# Patient Record
Sex: FEMALE | Race: WHITE | NOT HISPANIC OR LATINO | Employment: STUDENT | URBAN - METROPOLITAN AREA
[De-identification: names, ages, dates, MRNs, and addresses within clinical notes are randomized per-mention and may not be internally consistent; named-entity substitution may affect disease eponyms.]

---

## 2019-12-17 LAB — EXTERNAL CHLAMYDIA RESULT: NEGATIVE

## 2021-12-20 ENCOUNTER — VBI (OUTPATIENT)
Dept: ADMINISTRATIVE | Facility: OTHER | Age: 21
End: 2021-12-20

## 2021-12-30 RX ORDER — NORETHINDRONE ACETATE AND ETHINYL ESTRADIOL 1MG-20(21)
1 KIT ORAL DAILY
COMMUNITY
End: 2022-01-03

## 2021-12-30 RX ORDER — BIOTIN 10 MG
1 TABLET ORAL DAILY
COMMUNITY

## 2022-01-03 ENCOUNTER — ANNUAL EXAM (OUTPATIENT)
Dept: OBGYN CLINIC | Facility: CLINIC | Age: 22
End: 2022-01-03
Payer: COMMERCIAL

## 2022-01-03 VITALS
DIASTOLIC BLOOD PRESSURE: 64 MMHG | SYSTOLIC BLOOD PRESSURE: 110 MMHG | HEIGHT: 64 IN | WEIGHT: 142.4 LBS | BODY MASS INDEX: 24.31 KG/M2

## 2022-01-03 DIAGNOSIS — Z01.419 WOMEN'S ANNUAL ROUTINE GYNECOLOGICAL EXAMINATION: Primary | ICD-10-CM

## 2022-01-03 DIAGNOSIS — Z11.3 SCREEN FOR STD (SEXUALLY TRANSMITTED DISEASE): ICD-10-CM

## 2022-01-03 PROCEDURE — 99395 PREV VISIT EST AGE 18-39: CPT | Performed by: STUDENT IN AN ORGANIZED HEALTH CARE EDUCATION/TRAINING PROGRAM

## 2022-01-03 RX ORDER — ATOMOXETINE 80 MG/1
1 CAPSULE ORAL DAILY
COMMUNITY
Start: 2021-12-14

## 2022-01-03 NOTE — PATIENT INSTRUCTIONS
Pap every 3 years if normal, STI testing as indicated, exercise most days of week, obtain appropriate diet and hydration, Calcium 1000mg + 600 vit D daily, birth control as directed   Condom use when sexually active for sexually transmitted infection prevention    Annual mammogram starting at age 35, monthly breast self exam

## 2022-01-03 NOTE — PROGRESS NOTES
[address], [address], [facility], [address] [facility]    ASSESSMENT/PLAN: Ashanti Jovel is a 24 y o  Michele Eaton who presents for annual gynecologic exam     Encounter for routine gynecologic examination  - Routine well woman exam completed today  - Cervical Cancer Screening: Current ASCCP Guidelines reviewed  Last Pap: 12/30/2020  History of abnormal: None  - STI screening offered including HIV testing: GC/CT and blood work ordered  - Contraceptive counseling discussed  Current contraception: no method  Same sex partner, cycles regular  Declines contraception   - The following were reviewed in today's visit: breast self exam, STD testing and family planning choices    Additional problems addressed during this visit:  1  Women's annual routine gynecological examination    2  Screen for STD (sexually transmitted disease)  -     Chlamydia/N  gonorrhoeae RNA, TMA  -     RPR (MONITOR) W/REFL TITER (REFL); Future  -     Human Immunodeficiency Virus 1/2 Antigen / Antibody ( Fourth Generation) with Reflex Testing; Future  -     Hepatitis C Ab W/Refl To HCV RNA, Qn, PCR; Future        CC:  Annual Gynecologic Examination    HPI: Ashanti Jovel is a 24 y o  Michele Eaton who presents for annual gynecologic examination  ROS: Negative except as noted in HPI    Patient's last menstrual period was 12/27/2021  Menstrual History  Period Cycle (Days): 28  Period Duration (Days): 3-4  Period Pattern: Regular  Menstrual Flow: Heavy  Menstrual Control: Tampon  Dysmenorrhea: (!) Moderate  Dysmenorrhea Symptoms: Cramping    She  reports being sexually active and has had partner(s) who are female  She reports using the following method of birth control/protection: None    Sexual History  Sexual Assault: No  Number of Partners in Last Year: 1 years  Sexually Transmitted Infection History: None  Multiple Sex Partners: No  Is Patient in a Monogamous Relationship?: Yes    The following portions of the patient's history were reviewed and updated as appropriate:   No past medical history on file  Past Surgical History:   Procedure Laterality Date    TONSILECTOMY AND ADNOIDECTOMY       Family History   Problem Relation Age of Onset    Breast cancer Paternal Grandmother 79    Lung cancer Paternal Grandmother     Ovarian cancer Neg Hx     Uterine cancer Neg Hx     Colon cancer Neg Hx      Social History     Socioeconomic History    Marital status: Single     Spouse name: None    Number of children: None    Years of education: None    Highest education level: None   Occupational History    None   Tobacco Use    Smoking status: Never Smoker    Smokeless tobacco: Never Used   Vaping Use    Vaping Use: Never used   Substance and Sexual Activity    Alcohol use: Yes     Comment: social    Drug use: Never    Sexual activity: Yes     Partners: Female     Birth control/protection: None   Other Topics Concern    None   Social History Narrative    Sexual Abuse History: no    Exercise: Occassionally Occassionally    Domestic violence: No No    History of drug/alcohol abuse denies     Social Determinants of Health     Financial Resource Strain: Not on file   Food Insecurity: Not on file   Transportation Needs: Not on file   Physical Activity: Not on file   Stress: Not on file   Social Connections: Not on file   Intimate Partner Violence: Not on file   Housing Stability: Not on file     Outpatient Medications Marked as Taking for the 1/3/22 encounter (Annual Exam) with Camila Adams MD   Medication    atomoxetine (STRATTERA) 80 MG capsule    Biotin 10 MG TABS     No Known Allergies        Objective:  /64 (BP Location: Left arm, Patient Position: Sitting, Cuff Size: Standard)   Ht 5' 3 5" (1 613 m)   Wt 64 6 kg (142 lb 6 4 oz)   LMP 12/27/2021   Breastfeeding No   BMI 24 83 kg/m²        Chaperone present? Yes: Danilo Nelson MA  General Appearance: alert and oriented, in no acute distress     Neck/Thyroid: No thyromegaly, no thyroid nodules  Respiratory: Unlabored breathing  Cardiovascular: Regular rate, no peripheral edema  Abdomen: Soft, non-tender, non-distended, no masses, no rebound or guarding  Breast Exam: No dimpling, nipple retraction or discharge  No lumps or masses  No axillary or supraclavicular nodes  Pelvic:       External genitalia: Normal appearance, no abnormal pigmentation, no lesions or masses  Normal Bartholin's and [place]'s  Urinary system: Urethral meatus normal, bladder non-tender  Vaginal: normal mucosa without prolapse or lesions  Normal-appearing physiologic discharge  Cervix: Normal-appearing, well-epithelialized, no gross lesions or masses  No cervical motion tenderness  Adnexa: No adnexal masses or tenderness noted  Uterus: Normal-sized, regular contour, midline, mobile, no uterine tenderness  Extremities: Normal range of motion  Warm, well-perfused, non-tender     Skin: normal, no rash or abnormalities  Neurologic: alert, oriented x3  Psychiatric: Appropriate affect, mood stable, cooperative with exam         Lesley Dunham MD  1/3/2022 1:32 PM

## 2022-01-04 LAB
C TRACH RRNA SPEC QL NAA+PROBE: NOT DETECTED
N GONORRHOEA RRNA SPEC QL NAA+PROBE: NOT DETECTED

## 2023-02-09 ENCOUNTER — OFFICE VISIT (OUTPATIENT)
Dept: OBGYN CLINIC | Facility: CLINIC | Age: 23
End: 2023-02-09

## 2023-02-09 VITALS
BODY MASS INDEX: 23.22 KG/M2 | HEIGHT: 64 IN | SYSTOLIC BLOOD PRESSURE: 100 MMHG | DIASTOLIC BLOOD PRESSURE: 60 MMHG | WEIGHT: 136 LBS

## 2023-02-09 DIAGNOSIS — Z01.419 ENCOUNTER FOR GYNECOLOGICAL EXAMINATION WITHOUT ABNORMAL FINDING: Primary | ICD-10-CM

## 2023-02-09 DIAGNOSIS — Z80.3 FH: BREAST CANCER: ICD-10-CM

## 2023-02-09 DIAGNOSIS — Z11.3 SCREEN FOR STD (SEXUALLY TRANSMITTED DISEASE): ICD-10-CM

## 2023-02-09 RX ORDER — DEXMETHYLPHENIDATE HYDROCHLORIDE 25 MG/1
25 CAPSULE, EXTENDED RELEASE ORAL DAILY
COMMUNITY
Start: 2023-01-26

## 2023-02-09 NOTE — PROGRESS NOTES
Assessment/Plan:    Pap every 3 years if normal, Last 12/2020 STI testing as indicated, exercise most days of week, obtain appropriate diet and hydration, Calcium 1000mg + 600 vit D daily, Annual mammogram starting at age 35, monthly breast self exam     Diagnoses and all orders for this visit:    Encounter for gynecological examination without abnormal finding  -     Chlamydia/N  gonorrhoeae RNA, TMA    Screen for STD (sexually transmitted disease)  -     Chlamydia/N  gonorrhoeae RNA, TMA    FH: breast cancer PGM    Other orders  -     dexmethylphenidate (FOCALIN XR) 25 MG 24 hr capsule; Take 25 mg by mouth daily          Subjective:      Patient ID: Chris Fuchs is a 20 y o  female  Go here for annual gyn Same sex partner PAP 12/28/2020 neg Doing well No Concerns Periods monthly normal flow  Denies other abd or pelvic pain  Bowel and bladder are normal No unusual discharge Graduated [employer]  Does not start job until the summer Studying for [employer]       The following portions of the patient's history were reviewed and updated as appropriate: allergies, current medications, past family history, past medical history, past social history, past surgical history and problem list     Review of Systems   Constitutional: Negative for fatigue and unexpected weight change  Gastrointestinal: Negative for abdominal distention, abdominal pain, constipation and diarrhea  Genitourinary: Negative for difficulty urinating, dyspareunia, dysuria, frequency, genital sores, menstrual problem, pelvic pain, urgency, vaginal bleeding, vaginal discharge and vaginal pain  Neurological: Negative for headaches  Psychiatric/Behavioral: Negative  Negative for dysphoric mood  The patient is not nervous/anxious  Objective:      /60   Ht 5' 3 75" (1 619 m)   Wt 61 7 kg (136 lb)   LMP 01/09/2023   BMI 23 53 kg/m²          Physical Exam  Vitals and nursing note reviewed     Constitutional:       General: She is not in acute distress  Appearance: Normal appearance  HENT:      Head: Normocephalic and atraumatic  Pulmonary:      Effort: Pulmonary effort is normal    Chest:   Breasts:     Breasts are symmetrical       Right: Normal  No mass, nipple discharge, skin change or tenderness  Left: Normal  No mass, nipple discharge, skin change or tenderness  Abdominal:      General: There is no distension  Palpations: Abdomen is soft  Tenderness: There is no abdominal tenderness  There is no guarding or rebound  Genitourinary:     General: Normal vulva  Exam position: Lithotomy position  Labia:         Right: No rash, tenderness, lesion or injury  Left: No rash, tenderness, lesion or injury  Urethra: No prolapse, urethral pain, urethral swelling or urethral lesion  Vagina: Normal  No erythema or lesions  Cervix: No cervical motion tenderness, discharge, lesion or cervical bleeding  Uterus: Normal        Adnexa: Right adnexa normal and left adnexa normal         Right: No mass or tenderness  Left: No mass or tenderness  Rectum: No mass or external hemorrhoid  Comments: G/C from cervix  Musculoskeletal:         General: Normal range of motion  Lymphadenopathy:      Upper Body:      Right upper body: No axillary adenopathy  Left upper body: No axillary adenopathy  Lower Body: No right inguinal adenopathy  No left inguinal adenopathy  Skin:     General: Skin is warm and dry  Neurological:      Mental Status: She is alert and oriented to person, place, and time  Psychiatric:         Mood and Affect: Mood normal          Behavior: Behavior normal          Thought Content:  Thought content normal          Judgment: Judgment normal

## 2023-02-09 NOTE — PATIENT INSTRUCTIONS
Pap every 3 years if normal, Last 12/2020 STI testing as indicated, exercise most days of week, obtain appropriate diet and hydration, Calcium 1000mg + 600 vit D daily, Annual mammogram starting at age 35, monthly breast self exam

## 2023-02-10 LAB
C TRACH RRNA SPEC QL NAA+PROBE: NOT DETECTED
N GONORRHOEA RRNA SPEC QL NAA+PROBE: NOT DETECTED

## 2024-03-03 NOTE — PROGRESS NOTES
"Assessment/Plan:    Pap every 3 years if normal, STI testing as indicated, exercise most days of week, obtain appropriate diet and hydration, Calcium 1000mg + 600 vit D daily, .   Annual mammogram starting at age 39, monthly breast self exam.      Diagnoses and all orders for this visit:    Encounter for gynecological examination without abnormal finding  -     Thinprep Tis Pap Reflex HPV mRNA E6/E7, Chlamydia/N.gonorrhoeae    Screen for STD (sexually transmitted disease)  -     Thinprep Tis Pap Reflex HPV mRNA E6/E7, Chlamydia/N.gonorrhoeae    Encounter for Papanicolaou smear for cervical cancer screening  -     Thinprep Tis Pap Reflex HPV mRNA E6/E7, Chlamydia/N.gonorrhoeae    FH: breast cancer PGM          Subjective:      Patient ID: Clarisse Chino is a 23 y.o. female.    Go here for annual gyn Same sex partner PAP 12/28/2020 neg Doing well No Concerns Periods monthly normal flow. 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    Review of Systems   Constitutional:  Negative for fatigue and unexpected weight change.   Gastrointestinal:  Negative for abdominal distention, abdominal pain, constipation and diarrhea.   Genitourinary:  Negative for difficulty urinating, dyspareunia, dysuria, frequency, genital sores, menstrual problem, pelvic pain, urgency, vaginal bleeding, vaginal discharge and vaginal pain.   Neurological:  Negative for headaches.   Psychiatric/Behavioral: Negative.  Negative for dysphoric mood. The patient is not nervous/anxious.          Objective:      BP (!) 88/58   Ht 5' 3.75\" (1.619 m)   Wt 62.1 kg (136 lb 12.8 oz)   LMP 03/01/2024 " (Exact Date)   Breastfeeding No   BMI 23.67 kg/m²          Physical Exam  Vitals and nursing note reviewed.   Constitutional:       General: She is not in acute distress.     Appearance: Normal appearance.   HENT:      Head: Normocephalic and atraumatic.   Pulmonary:      Effort: Pulmonary effort is normal.   Chest:   Breasts:     Breasts are symmetrical.      Right: Normal. No mass, nipple discharge, skin change or tenderness.      Left: Normal. No mass, nipple discharge, skin change or tenderness.   Abdominal:      General: There is no distension.      Palpations: Abdomen is soft.      Tenderness: There is no abdominal tenderness. There is no guarding or rebound.   Genitourinary:     General: Normal vulva.      Exam position: Lithotomy position.      Labia:         Right: No rash, tenderness, lesion or injury.         Left: No rash, tenderness, lesion or injury.       Urethra: No prolapse, urethral pain, urethral swelling or urethral lesion.      Vagina: Normal. No erythema or lesions.      Cervix: No cervical motion tenderness, discharge, lesion or cervical bleeding.      Uterus: Normal.       Adnexa: Right adnexa normal and left adnexa normal.        Right: No mass or tenderness.          Left: No mass or tenderness.        Rectum: No mass or external hemorrhoid.   Musculoskeletal:         General: Normal range of motion.   Lymphadenopathy:      Upper Body:      Right upper body: No axillary adenopathy.      Left upper body: No axillary adenopathy.      Lower Body: No right inguinal adenopathy. No left inguinal adenopathy.   Skin:     General: Skin is warm and dry.   Neurological:      Mental Status: She is alert and oriented to person, place, and time.   Psychiatric:         Mood and Affect: Mood normal.         Behavior: Behavior normal.         Thought Content: Thought content normal.         Judgment: Judgment normal.

## 2024-03-04 ENCOUNTER — OFFICE VISIT (OUTPATIENT)
Dept: OBGYN CLINIC | Facility: CLINIC | Age: 24
End: 2024-03-04
Payer: COMMERCIAL

## 2024-03-04 VITALS
DIASTOLIC BLOOD PRESSURE: 58 MMHG | BODY MASS INDEX: 23.35 KG/M2 | SYSTOLIC BLOOD PRESSURE: 88 MMHG | HEIGHT: 64 IN | WEIGHT: 136.8 LBS

## 2024-03-04 DIAGNOSIS — Z01.419 ENCOUNTER FOR GYNECOLOGICAL EXAMINATION WITHOUT ABNORMAL FINDING: Primary | ICD-10-CM

## 2024-03-04 DIAGNOSIS — Z11.3 SCREEN FOR STD (SEXUALLY TRANSMITTED DISEASE): ICD-10-CM

## 2024-03-04 DIAGNOSIS — Z80.3 FH: BREAST CANCER: ICD-10-CM

## 2024-03-04 DIAGNOSIS — Z12.4 ENCOUNTER FOR PAPANICOLAOU SMEAR FOR CERVICAL CANCER SCREENING: ICD-10-CM

## 2024-03-04 PROCEDURE — S0612 ANNUAL GYNECOLOGICAL EXAMINA: HCPCS | Performed by: NURSE PRACTITIONER

## 2024-03-06 LAB
C TRACH RRNA SPEC QL NAA+PROBE: NOT DETECTED
CLINICAL INFO: NORMAL
CYTO CVX: NORMAL
CYTOLOGY CMNT CVX/VAG CYTO-IMP: NORMAL
DATE PREVIOUS BX: NORMAL
LMP START DATE: NORMAL
N GONORRHOEA RRNA SPEC QL NAA+PROBE: NOT DETECTED
SL AMB PREV. PAP:: NORMAL
SPECIMEN SOURCE CVX/VAG CYTO: NORMAL

## 2025-04-06 NOTE — PROGRESS NOTES
"Assessment/Plan:  Pap every 3 years if normal, STI testing as indicated, exercise most days of week, obtain appropriate diet and hydration, Calcium 1000mg + 600 vit D daily, .   Annual mammogram starting at age 39, monthly breast self exam.          Diagnoses and all orders for this visit:    Encounter for gynecological examination without abnormal finding    FH: breast cancer PGM 69    Other orders  -     Liquid-based pap, screening          Subjective:      Patient ID: Clarisse Chino is a 24 y.o. female.    Go here for annual gyn Same sex partner PAP 3/4/2024 neg prior 12/28/2020 neg Doing well No Concerns Periods monthly normal flow. 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    Review of Systems   Constitutional:  Negative for fatigue and unexpected weight change.   Gastrointestinal:  Negative for abdominal distention, abdominal pain, constipation and diarrhea.   Genitourinary:  Negative for difficulty urinating, dyspareunia, dysuria, frequency, genital sores, menstrual problem, pelvic pain, urgency, vaginal bleeding, vaginal discharge and vaginal pain.   Neurological:  Negative for headaches.   Psychiatric/Behavioral: Negative.  Negative for dysphoric mood. The patient is not nervous/anxious.          Objective:      /68 (BP Location: Right arm, Patient Position: Sitting, Cuff Size: Standard)   Ht 5' 3.75\" (1.619 m)   Wt 62.1 kg (137 lb)   LMP 03/28/2025   BMI 23.70 kg/m²          Physical Exam  Vitals and nursing note reviewed.   Constitutional:       General: She is not in acute distress.     Appearance: Normal appearance.   HENT:      Head: Normocephalic and atraumatic. "   Pulmonary:      Effort: Pulmonary effort is normal.   Chest:   Breasts:     Breasts are symmetrical.      Right: Normal. No mass, nipple discharge, skin change or tenderness.      Left: Normal. No mass, nipple discharge, skin change or tenderness.   Abdominal:      General: There is no distension.      Palpations: Abdomen is soft.      Tenderness: There is no abdominal tenderness. There is no guarding or rebound.   Genitourinary:     General: Normal vulva.      Exam position: Lithotomy position.      Labia:         Right: No rash, tenderness, lesion or injury.         Left: No rash, tenderness, lesion or injury.       Urethra: No prolapse, urethral pain, urethral swelling or urethral lesion.      Vagina: Normal. No erythema or lesions.      Cervix: No cervical motion tenderness, discharge, lesion or cervical bleeding.      Uterus: Normal.       Adnexa: Right adnexa normal and left adnexa normal.        Right: No mass or tenderness.          Left: No mass or tenderness.        Rectum: No mass or external hemorrhoid.   Musculoskeletal:         General: Normal range of motion.   Lymphadenopathy:      Upper Body:      Right upper body: No axillary adenopathy.      Left upper body: No axillary adenopathy.      Lower Body: No right inguinal adenopathy. No left inguinal adenopathy.   Skin:     General: Skin is warm and dry.   Neurological:      Mental Status: She is alert and oriented to person, place, and time.   Psychiatric:         Mood and Affect: Mood normal.         Behavior: Behavior normal.         Thought Content: Thought content normal.         Judgment: Judgment normal.

## 2025-04-07 ENCOUNTER — OFFICE VISIT (OUTPATIENT)
Dept: OBGYN CLINIC | Facility: CLINIC | Age: 25
End: 2025-04-07
Payer: COMMERCIAL

## 2025-04-07 VITALS
SYSTOLIC BLOOD PRESSURE: 110 MMHG | BODY MASS INDEX: 23.39 KG/M2 | DIASTOLIC BLOOD PRESSURE: 68 MMHG | HEIGHT: 64 IN | WEIGHT: 137 LBS

## 2025-04-07 DIAGNOSIS — Z80.3 FH: BREAST CANCER: ICD-10-CM

## 2025-04-07 DIAGNOSIS — Z01.419 ENCOUNTER FOR GYNECOLOGICAL EXAMINATION WITHOUT ABNORMAL FINDING: Primary | ICD-10-CM

## 2025-04-07 PROCEDURE — S0612 ANNUAL GYNECOLOGICAL EXAMINA: HCPCS | Performed by: NURSE PRACTITIONER

## 2025-08-06 PROBLEM — F90.9 ATTENTION DEFICIT HYPERACTIVITY DISORDER (ADHD): Status: ACTIVE | Noted: 2025-08-06

## 2025-08-06 PROBLEM — F41.9 ANXIETY DISORDER, UNSPECIFIED: Status: ACTIVE | Noted: 2025-08-06

## 2025-08-06 PROBLEM — K58.1 IRRITABLE BOWEL SYNDROME WITH CONSTIPATION: Status: ACTIVE | Noted: 2025-08-06

## 2025-08-19 ENCOUNTER — OFFICE VISIT (OUTPATIENT)
Age: 25
End: 2025-08-19
Payer: COMMERCIAL

## 2025-08-19 VITALS
HEIGHT: 64 IN | BODY MASS INDEX: 23.39 KG/M2 | HEART RATE: 83 BPM | SYSTOLIC BLOOD PRESSURE: 99 MMHG | OXYGEN SATURATION: 98 % | DIASTOLIC BLOOD PRESSURE: 66 MMHG | WEIGHT: 137 LBS

## 2025-08-19 DIAGNOSIS — F90.0 ATTENTION DEFICIT HYPERACTIVITY DISORDER (ADHD), PREDOMINANTLY INATTENTIVE TYPE: ICD-10-CM

## 2025-08-19 DIAGNOSIS — F41.9 ANXIETY: Primary | ICD-10-CM

## 2025-08-19 PROCEDURE — 99213 OFFICE O/P EST LOW 20 MIN: CPT | Performed by: NURSE PRACTITIONER

## 2025-08-19 RX ORDER — ESCITALOPRAM OXALATE 10 MG/1
10 TABLET ORAL DAILY
Qty: 90 TABLET | Refills: 1 | Status: SHIPPED | OUTPATIENT
Start: 2025-08-19 | End: 2026-02-15

## 2025-08-19 RX ORDER — ESCITALOPRAM OXALATE 5 MG/1
5 TABLET ORAL DAILY
COMMUNITY
Start: 2025-06-23 | End: 2025-08-19